# Patient Record
Sex: FEMALE | Race: BLACK OR AFRICAN AMERICAN
[De-identification: names, ages, dates, MRNs, and addresses within clinical notes are randomized per-mention and may not be internally consistent; named-entity substitution may affect disease eponyms.]

---

## 2019-07-02 ENCOUNTER — HOSPITAL ENCOUNTER (OUTPATIENT)
Dept: HOSPITAL 61 - US | Age: 64
Discharge: HOME | End: 2019-07-02
Attending: FAMILY MEDICINE
Payer: COMMERCIAL

## 2019-07-02 DIAGNOSIS — E04.1: Primary | ICD-10-CM

## 2019-07-02 PROCEDURE — 76536 US EXAM OF HEAD AND NECK: CPT

## 2019-07-02 NOTE — RAD
Thyroid ultrasound July 2, 2019

 

INDICATION: Hypercalcemia.

 

COMPARISON: None available

 

TECHNIQUE: Sonographic evaluation of the thyroid gland was performed 

utilizing grayscale and color Doppler.

 

FINDINGS:

 

The right thyroid lobe measures 4.2 x 1.2 x 1.2 cm. No suspicious thyroid 

nodules. There is no significant hyperemia. There is homogeneous 

echotexture of the thyroid parenchyma. Thyroid isthmus measures 6 mm.

 

Left thyroid lobe measures 4.1 x 1.4 x 1.3 cm. Parenchyma is homogeneous 

without significant hyperemia. There is a ill-defined 12 x 10 x 8 mm 

hyperechoic mass which is wider than it is tall with coarse calcification.

 

IMPRESSION:

1. 12 mm moderately suspicious nodule junction of the left thyroid lobe 

and thyroid isthmus. Recommend one-year follow-up thyroid ultrasound to 

assess stability. Recommend fine-needle aspiration if greater than 1.5 cm.

 

Electronically signed by: Annamarie Ignacio MD (7/2/2019 8:09 AM) 

[facility]

## 2019-09-11 ENCOUNTER — HOSPITAL ENCOUNTER (OUTPATIENT)
Dept: HOSPITAL 61 - US | Age: 64
Discharge: HOME | End: 2019-09-11
Attending: SPECIALIST
Payer: COMMERCIAL

## 2019-09-11 DIAGNOSIS — E04.1: Primary | ICD-10-CM

## 2019-09-11 PROCEDURE — 76942 ECHO GUIDE FOR BIOPSY: CPT

## 2019-09-11 PROCEDURE — 88305 TISSUE EXAM BY PATHOLOGIST: CPT

## 2019-09-11 PROCEDURE — 88173 CYTOPATH EVAL FNA REPORT: CPT

## 2019-09-11 PROCEDURE — 60300 ASPIR/INJ THYROID CYST: CPT

## 2019-09-11 NOTE — RAD
Examination: US GUID NDL PLACE/ASPI/BX

 

History: Left thyroid lobe-isthmus nodule.

 

Comparison/Correlation: 07/02/2019 thyroid ultrasound exam

 

Findings: Risks and benefits of thyroid nodule fine-needle aspiration by 

ultrasound guidance were discussed with the patient and informed consent 

was obtained.

 

Preliminary ultrasound imaging was performed. Lateral approach was 

utilized. Cleansing with ChloraPrep was performed. Sterile drapes placed. 

Sterile gel and sterile probe cover were utilized. 10 cc 1 percent 

lidocaine was administered. A total of 4 needles each with its on syringe 

were utilized for passes into the left thyroid lobe-isthmus nodule of 

interest. Samples were provided to the  who stated that 

they are adequate. The patient tolerated procedure well without immediate 

complications.

 

 

Impression:

Successful left thyroid lobe and isthmus nodule fine-needle aspiration.

 

Electronically signed by: Bright Samuels MD (9/11/2019 9:28 AM) [facility]

## 2019-09-16 NOTE — PATHOLOGY
Note

[facility] Accession Number: [id number]

   TESTS               RESULT  FLAG  UNITS    REF RANGE  LAB

------------------------------------------------------------

   Clinician Provided Cytology Information

   C6                        Unknown Storage/container code(s)

Source:                                                   01

   LT ISTHMUS THYROID

DIAGNOSIS:                                                02

   LT ISTHMUS THYROID

   NEGATIVE FOR MALIGNANT CELLS.

   BETHESDA CATEGORY II. SPECIMEN CONSISTS OF ABUNDANT BENIGN FOLLICULAR

   CELLS, HEMOSIDERIN-LADEN MACROPHAGES, SCANT COLLOID AND BLOOD. THE PATTERN

   IS CONSISTENT WITH ADENOMATOID NODULE.

   COLLOID IS PRESENT.

   THIS INTERPRETATION INCLUDES EVALUATION OF A CELL BLOCK.

Pathologist ICD10:                                        02

   E04.1

Signed out by:                                            02

   Bello Varma MD, Pathologist

   NPI- [id number]

Performed by:                                             01

   Yolanda Escobar, Cytotechnologist ([facility])

Gross description:                                        01

   30ML, RED, CLOUDY

   /LCS  12/31/1840  0000 Local



------------------------------------------------------------

    FLAG LEGEND:

    L-Low Normal,H-High Normal,LL-Alert Low,HH-Alert High

    <-Panic Low,>-Panic High,A-Abnormal,AA-Critical Abnormal

------------------------------------------------------------



Performed at:

01 [facility] [address]

   [place]  [address]

   Huy Verdugo MD, Phone: [phone]

02 [facility]

   [address] [address]  [address]

   Mariama Adames MD, Phone: [phone]

Specimen Comment: A courtesy copy of this report has been sent to

Specimen Comment: [phone], [phone], [phone].

Specimen Comment: Report sent to ,DR ALEMAN / DR DAVILA

Specimen Comment: A duplicate report has been generated due to demographic updates.

***Performed at:  01

   [facility] [id number] [facility] [address], [place], [place]  [id number]

   MD Huy Verdugo MD Phone:  [phone]